# Patient Record
Sex: MALE | Race: WHITE
[De-identification: names, ages, dates, MRNs, and addresses within clinical notes are randomized per-mention and may not be internally consistent; named-entity substitution may affect disease eponyms.]

---

## 2022-07-02 ENCOUNTER — HOSPITAL ENCOUNTER (EMERGENCY)
Dept: HOSPITAL 75 - ER | Age: 72
Discharge: HOME | End: 2022-07-02
Payer: MEDICARE

## 2022-07-02 VITALS — DIASTOLIC BLOOD PRESSURE: 77 MMHG | SYSTOLIC BLOOD PRESSURE: 154 MMHG

## 2022-07-02 VITALS — BODY MASS INDEX: 26.69 KG/M2 | HEIGHT: 71.65 IN | WEIGHT: 194.89 LBS

## 2022-07-02 DIAGNOSIS — S61.012A: Primary | ICD-10-CM
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
## 2022-07-02 DIAGNOSIS — Z28.310: ICD-10-CM

## 2022-07-02 NOTE — ED UPPER EXTREMITY
General


Chief Complaint:  Laceration


Stated Complaint:  L HAND LAC


Source:  patient


Exam Limitations:  no limitations





History of Present Illness


Date Seen by Provider:  Jul 2, 2022


Time Seen by Provider:  14:36


Initial Comments


Patient is a 72-year-old male who presents to the ED with injury to his left 

palmar side of his thumb.  Patient states he was weed eating with a thick 

plastic string when his hand got caught against the string when bending over to 

grab something.  This resulted in superficial and adipose injury of the left 

palmar thumb.  Patient has 2 areas on the palmar side of the thumb with 

bleeding.  Normal active range of motion left thumb.  Up-to-date on his tetanus 

within the past 3 years.





Allergies and Home Medications


Allergies


Coded Allergies:  


     No Known Drug Allergies (Unverified , 7/2/22)





Patient Home Medication List


Home Medication List Reviewed:  Yes


Cephalexin (Cephalexin) 500 Mg Tablet, 500 MG PO QID


   Prescribed by: MARCELLO CASANOVA on 7/2/22 1445


Hydrocodone/Acetaminophen (Hydrocodone-Acetamin 5-325 mg) 5 Mg-325 Mg Tablet, 1 

TAB PO Q4H PRN for PAIN-MODERATE (5-7)


   Prescribed by: MARCELLO CASANOVA on 7/2/22 1454





Review of Systems


Constitutional:  No chills, No diaphoresis, No malaise, No weakness


EENTM:  No ear pain, No blurred vision, No double vision


Respiratory:  No cough, No dyspnea on exertion, No phlegm, No short of breath


Cardiovascular:  No chest pain


Gastrointestinal:  No abdominal pain, No diarrhea, No nausea, No vomiting


Genitourinary:  No decreased output, No discharge


Musculoskeletal:  No back pain; joint pain, muscle pain


Skin:  change in color, other (laceration, abrasion)





All Other Systems Reviewed


Negative Unless Noted:  Yes





Past Medical-Social-Family Hx


Patient Social History


Tobacco Use?:  No


Use of E-Cig and/or Vaping dev:  No


Substance use?:  No


Alcohol Use?:  No


Pt feels they are or have been:  No





Physical Exam


Vital Signs





Vital Signs - First Documented








 7/2/22





 14:24


 


Temp 35.3


 


Pulse 50


 


Resp 20


 


B/P (MAP) 154/77 (102)


 


Pulse Ox 97


 


O2 Delivery Room Air





Capillary Refill :


Height, Weight, BMI


Height: '"


Weight: lbs. oz. kg;  BMI


Method:


General Appearance:  WD/WN, no apparent distress


HEENT:  PERRL/EOMI, normal ENT inspection, TMs normal, pharynx normal


Neck:  non-tender, full range of motion, supple, normal inspection


Cardiovascular:  regular rate, rhythm, no edema, no gallop, no JVD


Respiratory:  chest non-tender, lungs clear, normal breath sounds, no respira

tory distress, no accessory muscle use


Gastrointestinal:  normal bowel sounds, non tender, soft, no organomegaly


Back:  normal inspection, no CVA tenderness, no vertebral tenderness


Hand:  normal ROM (Of the left thumb), Left, laceration, soft tissue tenderness 

(Soft tissue injury to the left palmar thumb 2 sites with skin and adipose 

involvement.)


Neurologic/Psychiatric:  CNs II-XII nml as tested, no motor/sensory deficits, 

alert, normal mood/affect, oriented x 3


Skin:  other (Superficial skin tears, adipose tears to left palmar thumb.)





Progress/Results/Core Measures


Results/Orders


My Orders





Orders - HILARY ARMSTRONG


Ibuprofen  Tablet (Motrin  Tablet) (7/2/22 14:45)





Medications Given in ED





Current Medications








 Medications  Dose


 Ordered  Sig/[place]


 Route  Start Time


 Stop Time Status Last Admin


Dose Admin


 


 Ibuprofen  800 mg  ONCE  ONCE


 PO  7/2/22 14:45


 7/2/22 14:47 DC 7/2/22 14:51


800 MG








Vital Signs/I&O











 7/2/22





 14:24


 


Temp 35.3


 


Pulse 50


 


Resp 20


 


B/P (MAP) 154/77 (102)


 


Pulse Ox 97


 


O2 Delivery Room Air











Departure


Communication (PCP)


Patient with two quarter size skin injury sites to the left palmar thumb.  

Adipose and skin involvement.  normal active range of motion of the left thumb. 

Discussed with patient due to the injury not able to approximate any of the 

tissue due to the size and not able to approximate tissue.  This will heal by 

secondary intention.  Extensive irrigation here.  Wound appears to be 

contaminated.  Patient has been working outside with grease on the hand.  Will 

discharge with Keflex prophylactically.  Up-to-date on his tetanus within the 

past 3 years.  This will be managed by wound care at home and needs to follow-up

with primary care physician in 3 to 4 days for reevaluation.  Discussed soap and

water with Neosporin daily.  We will provide gauze for patient.  Discussed if 

any worsening symptoms such as redness, swelling, fever to return back to ED.  

Patient was given ibuprofen per his request.





Impression





   Primary Impression:  


   Hand injury


Disposition:  01 HOME, SELF-CARE


Condition:  Stable





Departure-Patient Inst.


Decision time for Depature:  14:43


Referrals:  


NO,LOCAL PHYSICIAN (PCP/Family)


Primary Care Physician


Patient Instructions:  Skin Abrasions (DC)





Add. Discharge Instructions:  





Switch out dressing daily.  Neosporin once or twice a day.  Antibiotics 

prophylactically to prevent infection.  Pain medication as needed for pain.  

Strongly recommend follow-up your primary care physician mid next week for 

reevaluation of the wound.  If any worsening pain, swelling or redness to return

back to ED for further evaluation.


All discharge instructions reviewed with patient and/or family. Voiced 

understanding.


Scripts


Hydrocodone/Acetaminophen (Hydrocodone-Acetamin 5-325 mg) 5 Mg-325 Mg Tablet


1 TAB PO Q4H PRN for PAIN-MODERATE (5-7), #8 TAB


   Prov: HILARY ARMSTRONG         7/2/22 


Cephalexin (Cephalexin) 500 Mg Tablet


500 MG PO QID for 7 Days, #28 TAB


   Prov: HILARY ARMSTRONG         7/2/22











HILARY ARMSTRONG           Jul 2, 2022 14:41